# Patient Record
Sex: MALE | Race: WHITE | Employment: FULL TIME | URBAN - NONMETROPOLITAN AREA
[De-identification: names, ages, dates, MRNs, and addresses within clinical notes are randomized per-mention and may not be internally consistent; named-entity substitution may affect disease eponyms.]

---

## 2023-08-03 ENCOUNTER — OFFICE VISIT (OUTPATIENT)
Dept: PRIMARY CARE CLINIC | Age: 43
End: 2023-08-03

## 2023-08-03 VITALS
OXYGEN SATURATION: 98 % | RESPIRATION RATE: 18 BRPM | HEART RATE: 80 BPM | WEIGHT: 235 LBS | BODY MASS INDEX: 40.12 KG/M2 | SYSTOLIC BLOOD PRESSURE: 130 MMHG | DIASTOLIC BLOOD PRESSURE: 80 MMHG | TEMPERATURE: 98.9 F | HEIGHT: 64 IN

## 2023-08-03 DIAGNOSIS — H66.90 ACUTE OTITIS MEDIA, UNSPECIFIED OTITIS MEDIA TYPE: Primary | ICD-10-CM

## 2023-08-03 DIAGNOSIS — J01.00 ACUTE NON-RECURRENT MAXILLARY SINUSITIS: ICD-10-CM

## 2023-08-03 DIAGNOSIS — K02.9 DENTAL CARIES: ICD-10-CM

## 2023-08-03 RX ORDER — AMOXICILLIN AND CLAVULANATE POTASSIUM 875; 125 MG/1; MG/1
1 TABLET, FILM COATED ORAL 2 TIMES DAILY
Qty: 14 TABLET | Refills: 0 | Status: SHIPPED | OUTPATIENT
Start: 2023-08-03 | End: 2023-08-10

## 2023-08-03 ASSESSMENT — ENCOUNTER SYMPTOMS
SORE THROAT: 0
TROUBLE SWALLOWING: 0
VOMITING: 0
RHINORRHEA: 1
SINUS PAIN: 0
SHORTNESS OF BREATH: 0
SINUS PRESSURE: 1
COUGH: 1
DIARRHEA: 0
ABDOMINAL PAIN: 0

## 2023-08-03 NOTE — PATIENT INSTRUCTIONS
Antibiotics as prescribed with probiotic , food or yogurt to prevent diarrhea  Follow up if s/s worsen not improving.      Ear keep clean and dry

## 2023-08-03 NOTE — PROGRESS NOTES
[facility] [address] [facility]  [address]  Dept: [phone]  Dept Fax: : [phone]  [id number] [facility] Fax: [phone]    Victor M Escobar is a 41 y.o. male who presents today for his medical conditions/complaints as noted below. Chief Complaint   Patient presents with    Otalgia     Earache, patient did initially have some sinus/URI symptoms that started last Tuesday. Reports today his ears are bothering him. HPI:     Deepthi Nicolas is here for sinus symptoms and right ear discomfort. Symptoms initially started wit URI, nasal congestion and sore throat  Taking tylenol for discomfort        Otalgia   There is pain in the right ear. This is a new problem. The current episode started in the past 7 days. The problem occurs constantly. The problem has been gradually worsening. There has been no fever. The pain is at a severity of 4/10. The pain is moderate. Associated symptoms include coughing and rhinorrhea. Pertinent negatives include no abdominal pain, diarrhea, ear discharge, headaches, hearing loss, neck pain, rash, sore throat or vomiting. He has tried acetaminophen for the symptoms. There is no history of a chronic ear infection, hearing loss or a tympanostomy tube. Current Outpatient Medications   Medication Sig Dispense Refill    amoxicillin-clavulanate (AUGMENTIN) 875-125 MG per tablet Take 1 tablet by mouth 2 times daily for 7 days 14 tablet 0     No current facility-administered medications for this visit. No Known Allergies    Subjective:      Review of Systems   Constitutional:  Negative for chills and fever. HENT:  Positive for congestion, dental problem, ear pain, rhinorrhea, sinus pressure and tinnitus. Negative for ear discharge, hearing loss, sinus pain, sore throat and trouble swallowing. Respiratory:  Positive for cough. Negative for shortness of breath.     Gastrointestinal:  Negative for